# Patient Record
Sex: FEMALE | URBAN - METROPOLITAN AREA
[De-identification: names, ages, dates, MRNs, and addresses within clinical notes are randomized per-mention and may not be internally consistent; named-entity substitution may affect disease eponyms.]

---

## 2020-06-18 ENCOUNTER — TELEPHONE (OUTPATIENT)
Dept: ONCOLOGY | Facility: CLINIC | Age: 70
End: 2020-06-18

## 2020-06-18 DIAGNOSIS — H31.8 CHOROIDAL LESION: ICD-10-CM

## 2020-06-18 DIAGNOSIS — C69.31 CHOROIDAL MALIGNANT MELANOMA, RIGHT (H): Primary | ICD-10-CM

## 2020-06-18 NOTE — TELEPHONE ENCOUNTER
I called Rosa to register her & schedule an appt per IB request from [place]. Rosa did not answer, so a voicemail was left requesting a return call. 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